# Patient Record
(demographics unavailable — no encounter records)

---

## 2024-10-15 NOTE — REVIEW OF SYSTEMS
[Fever] : no fever [Feeling Fatigued] : feeling fatigued [Blurry Vision] : no blurred vision [Earache] : no earache [SOB] : shortness of breath [Dyspnea on exertion] : dyspnea during exertion [Chest Discomfort] : chest discomfort [Cough] : no cough

## 2024-10-15 NOTE — PHYSICAL EXAM
[Well Developed] : well developed [Normal Conjunctiva] : normal conjunctiva [Normal Venous Pressure] : normal venous pressure [No Carotid Bruit] : no carotid bruit [Normal S1, S2] : normal S1, S2 [Clear Lung Fields] : clear lung fields [de-identified] : 3/6 ESM aortic area

## 2024-10-21 NOTE — REVIEW OF SYSTEMS
[Shortness Of Breath] : shortness of breath [Joint Pain] : joint pain [Joint Stiffness] : joint stiffness [Negative] : Cardiovascular

## 2024-10-22 NOTE — PHYSICAL EXAM
[Ambulatory and capable of all self care but unable to carry out any work activities] : Status 2- Ambulatory and capable of all self care but unable to carry out any work activities. Up and about more than 50% of waking hours [Normal] : affect appropriate [de-identified] : supple  [de-identified] : + murmur [de-identified] : no calf tenderness [de-identified] : Soft non tender  [de-identified] : no palpable tenderness [de-identified] : Antalgic gait

## 2024-10-22 NOTE — HISTORY OF PRESENT ILLNESS
[de-identified] : 73 yo F. with h/o rheumatoid arthritis, aortic stenosis, HTN, HLD, Vitamin B12 deficiency, diverticulosis and esophagitis. Patient was admitted at [facility] 2/10 to 2/15/2023 with SOB, HGB was 3.0 g/dL. She received 4 units of PRBC. EGD/colonoscopy done on 2/14 showed esophagitis as likely cause of GI bleed causing the anemia. Holland syndrome was ruled out. She was discharged on Ferrous Sulfate 1 tab daily. Patient is seen alone today in wheelchair. Patient reports taking Ferrous Sulfate since February with no adverse effects. Patient denies bleeding to all sites, no melena/hematochezia. Since February her HGB has been ranging from 8.1 to 9.9 g/dL. Most recent labs dated 11/6/2023 resulted with WBC 3.78 K/uL, RBC 4.07 M/uL, HGB 9.9 g/dL, HCT 32.3%, MCV 79.4 fl, RDW 16.6%,  K/uL, normal differential. Iron sat 6%, Ferritin 30 ng/mL, Vitamin B12 837 pg/mL, Folate 6.4 ng/mL, serum immunoelectrophoresis resulted with weak IgG kappa band- quantifiable Mspike. Patient states she completed Vitamin B12 replacement 2 weeks ago. 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 She is currently on Arava 20mg per day Orencia weekly. Recently completed Prednisone taper, states it was discontinued because of hypertension.  Patient's baseline hemoglobin level is normal. Patient eats a vegetarian diet.  Patient reports feeling well overall. Notes occ. headaches, with no lightheadedness/dizziness, chest pain, SOB and palpitations. Notes b/l knee and right shoulder pain/ stiffness.  Denies unexplained fevers, chills, night sweats and unintentional weight loss.   She was born in [place], [place] past 13 years She has 1 son living in [facility] She lives with her  and took a Taxi to the office [de-identified] : 10/21/2024: Patient presents for follow up of anemia. In the interim, received 1 dose of Feraheme in July. HGB since July 10.4 to 11.0 g/dL. Patient is scheduled for TAVR on 10/31 at [facility]. Patient feels good, no lightheadedness/ dizziness/ chest pain or palpitations. + SOB with exertion. Patient denies melena or hematochezia.

## 2024-10-22 NOTE — HISTORY OF PRESENT ILLNESS
[de-identified] : 71 yo F. with h/o rheumatoid arthritis, aortic stenosis, HTN, HLD, Vitamin B12 deficiency, diverticulosis and esophagitis. Patient was admitted at [facility] 2/10 to 2/15/2023 with SOB, HGB was 3.0 g/dL. She received 4 units of PRBC. EGD/colonoscopy done on 2/14 showed esophagitis as likely cause of GI bleed causing the anemia. Holland syndrome was ruled out. She was discharged on Ferrous Sulfate 1 tab daily. Patient is seen alone today in wheelchair. Patient reports taking Ferrous Sulfate since February with no adverse effects. Patient denies bleeding to all sites, no melena/hematochezia. Since February her HGB has been ranging from 8.1 to 9.9 g/dL. Most recent labs dated 11/6/2023 resulted with WBC 3.78 K/uL, RBC 4.07 M/uL, HGB 9.9 g/dL, HCT 32.3%, MCV 79.4 fl, RDW 16.6%,  K/uL, normal differential. Iron sat 6%, Ferritin 30 ng/mL, Vitamin B12 837 pg/mL, Folate 6.4 ng/mL, serum immunoelectrophoresis resulted with weak IgG kappa band- quantifiable Mspike. Patient states she completed Vitamin B12 replacement 2 weeks ago. 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 She is currently on Arava 20mg per day Orencia weekly. Recently completed Prednisone taper, states it was discontinued because of hypertension.  Patient's baseline hemoglobin level is normal. Patient eats a vegetarian diet.  Patient reports feeling well overall. Notes occ. headaches, with no lightheadedness/dizziness, chest pain, SOB and palpitations. Notes b/l knee and right shoulder pain/ stiffness.  Denies unexplained fevers, chills, night sweats and unintentional weight loss.   She was born in [place], living [place] the past 13 years She has 1 son living in [facility] She lives with her  and took a Taxi to the office [de-identified] : 10/21/2024: Patient presents for follow up of anemia. In the interim, received 1 dose of Feraheme in July. HGB since July 10.4 to 11.0 g/dL. Patient is scheduled for TAVR on 10/31 at [facility]. Patient feels good, no lightheadedness/ dizziness/ chest pain or palpitations. + SOB with exertion. Patient denies melena or hematochezia.

## 2024-10-22 NOTE — ASSESSMENT
[FreeTextEntry1] : 72 yo F. with h/o rheumatoid arthritis, aortic stenosis, HTN, HLD, Vitamin B12 deficiency, diverticulosis and esophagitis. Patient was admitted at [facility] 2/10 to 2/15/2023 with SOB, HGB was 3.0 g/dL. She received 4 units of PRBC. EGD/colonoscopy done on 2/14 showed esophagitis as likely cause of GI bleed causing the anemia. Holland syndrome was ruled out. Patient took Ferrous Sulfate 1 tab daily and remained iron deficient 1 year later. She was given 2 doses of Feraheme. Patient was started on oral vitamin B12 replacement and given an additional dose of Feraheme in July.  HGB improved and ranging from 10.4 to 11.0 g/dL over the past 3 months. Patient also has IgG MGUS.   --TIBC, Ferritin and pending, will call with results --We discussed anemia may be multifactorial due to chronic disease (rheumatoid arthritis) +/- iron deficiency +/- bone marrow disorder.  HGB has declined to 10.4 since last visit but stable overall. Will need BMB if no iron deficiency and HGB continues to decline. --RTC 3 months  10/22/24: Spoke with patient and discussed lab results c/w iron deficiency and vitamin B12 deficiency. Recommended Feraheme 510mg IV x 2 doses and start Vitamin B12 1000mcg PO daily. Recommended GI evaluation to evaluate for blood loss. RTC 3 months.

## 2024-10-22 NOTE — PHYSICAL EXAM
[Ambulatory and capable of all self care but unable to carry out any work activities] : Status 2- Ambulatory and capable of all self care but unable to carry out any work activities. Up and about more than 50% of waking hours [Normal] : affect appropriate [de-identified] : supple  [de-identified] : + murmur [de-identified] : no calf tenderness [de-identified] : Soft non tender  [de-identified] : no palpable tenderness [de-identified] : Antalgic gait

## 2024-10-22 NOTE — ASSESSMENT
[FreeTextEntry1] : 72 yo F. with h/o rheumatoid arthritis, aortic stenosis, HTN, HLD, Vitamin B12 deficiency, diverticulosis and esophagitis. Patient was admitted at [facility] 2/10 to 2/15/2023 with SOB, HGB was 3.0 g/dL. She received 4 units of PRBC. EGD/colonoscopy done on 2/14 showed esophagitis as likely cause of GI bleed causing the anemia. Holland syndrome was ruled out. Patient took Ferrous Sulfate 1 tab daily and remained iron deficient 1 year later. She was given 2 doses of Feraheme. Patient was started on oral vitamin B12 replacement and given an additional dose of Feraheme in July.  HGB improved and ranging from 10.4 to 11.0 g/dL over the past 3 months. Patient also has IgG MGUS.   --TIBC, Ferritin and pending, will call with results --We discussed anemia may be multifactorial due to chronic disease (rheumatoid arthritis) +/- iron deficiency +/- bone marrow disorder.  HGB has declined to 10.4 since last visit but stable overall. Will need BMB if no iron deficiency and HGB continues to decline. --RTC 3 months  10/22/24: Spoke with patient and discussed lab results c/w iron deficiency and vitamin B12 deficiency. Recommended Feraheme 510mg IV x 2 doses and start Vitamin B12 1000mcg PO daily. Recommended GI evaluation to evaluate for blood loss. RTC 3 months.

## 2024-10-22 NOTE — PHYSICAL EXAM
[Ambulatory and capable of all self care but unable to carry out any work activities] : Status 2- Ambulatory and capable of all self care but unable to carry out any work activities. Up and about more than 50% of waking hours [Normal] : affect appropriate [de-identified] : supple  [de-identified] : + murmur [de-identified] : no calf tenderness [de-identified] : Soft non tender  [de-identified] : no palpable tenderness [de-identified] : Antalgic gait

## 2024-10-22 NOTE — ASSESSMENT
[FreeTextEntry1] : 74 yo F. with h/o rheumatoid arthritis, aortic stenosis, HTN, HLD, Vitamin B12 deficiency, diverticulosis and esophagitis. Patient was admitted at [facility] 2/10 to 2/15/2023 with SOB, HGB was 3.0 g/dL. She received 4 units of PRBC. EGD/colonoscopy done on 2/14 showed esophagitis as likely cause of GI bleed causing the anemia. Holland syndrome was ruled out. Patient took Ferrous Sulfate 1 tab daily and remained iron deficient 1 year later. She was given 2 doses of Feraheme. Patient was started on oral vitamin B12 replacement and given an additional dose of Feraheme in July.  HGB improved and ranging from 10.4 to 11.0 g/dL over the past 3 months. Patient also has IgG MGUS.   --TIBC, Ferritin and pending, will call with results --We discussed anemia may be multifactorial due to chronic disease (rheumatoid arthritis) +/- iron deficiency +/- bone marrow disorder.  HGB has declined to 10.4 since last visit but stable overall. Will need BMB if no iron deficiency and HGB continues to decline. --RTC 3 months  10/22/24: Spoke with patient and discussed lab results c/w iron deficiency and vitamin B12 deficiency. Recommended Feraheme 510mg IV x 2 doses and start Vitamin B12 1000mcg PO daily. Recommended GI evaluation to evaluate for blood loss. RTC 3 months.

## 2024-10-22 NOTE — HISTORY OF PRESENT ILLNESS
[de-identified] : 71 yo F. with h/o rheumatoid arthritis, aortic stenosis, HTN, HLD, Vitamin B12 deficiency, diverticulosis and esophagitis. Patient was admitted at [facility] 2/10 to 2/15/2023 with SOB, HGB was 3.0 g/dL. She received 4 units of PRBC. EGD/colonoscopy done on 2/14 showed esophagitis as likely cause of GI bleed causing the anemia. Holland syndrome was ruled out. She was discharged on Ferrous Sulfate 1 tab daily. Patient is seen alone today in wheelchair. Patient reports taking Ferrous Sulfate since February with no adverse effects. Patient denies bleeding to all sites, no melena/hematochezia. Since February her HGB has been ranging from 8.1 to 9.9 g/dL. Most recent labs dated 11/6/2023 resulted with WBC 3.78 K/uL, RBC 4.07 M/uL, HGB 9.9 g/dL, HCT 32.3%, MCV 79.4 fl, RDW 16.6%,  K/uL, normal differential. Iron sat 6%, Ferritin 30 ng/mL, Vitamin B12 837 pg/mL, Folate 6.4 ng/mL, serum immunoelectrophoresis resulted with weak IgG kappa band- quantifiable Mspike. Patient states she completed Vitamin B12 replacement 2 weeks ago. 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 She is currently on Arava 20mg per day Orencia weekly. Recently completed Prednisone taper, states it was discontinued because of hypertension.  Patient's baseline hemoglobin level is normal. Patient eats a vegetarian diet.  Patient reports feeling well overall. Notes occ. headaches, with no lightheadedness/dizziness, chest pain, SOB and palpitations. Notes b/l knee and right shoulder pain/ stiffness.  Denies unexplained fevers, chills, night sweats and unintentional weight loss.   She was born in [place], [place] past 13 years She has 1 son living in [facility] She lives with her  and took a Taxi to the office [de-identified] : 10/21/2024: Patient presents for follow up of anemia. In the interim, received 1 dose of Feraheme in July. HGB since July 10.4 to 11.0 g/dL. Patient is scheduled for TAVR on 10/31 at [facility]. Patient feels good, no lightheadedness/ dizziness/ chest pain or palpitations. + SOB with exertion. Patient denies melena or hematochezia.

## 2024-11-06 NOTE — HISTORY OF PRESENT ILLNESS
[FreeTextEntry1] : Ms Brush is a 73 year old female referred by Dr. Rhonda Adams for evaluation of her aortic stenosis.  To review, PMH includes rheumatoid arthritis, aortic stenosis, HTN, HLD, Vitamin B12 deficiency, diverticulosis and esophagitis. Recently evaluated by Heme for her anemia.  in February 2023 at [facility] she was admitted with HGB was 3.0 g/dL, sp 4 units of PRBC and EGD/colonoscopy that showed esophagitis, maintained on ferrous sulfate since.   TTE 9/19/2024: The aortic valve appears trileaflet with reduced systolic excursion. There is calcification of the aortic valve leaflets. There is severe aortic stenosis. The peak transaortic velocity is 3.96 m/s, peak transaortic gradient is 62.7 mmHg and mean transaortic gradient is 37.0 mmHg with an LVOT/aortic valve VTI ratio of 0.27. The aortic valve area is estimated at 0.69 cm by the continuity equation. There is mild aortic regurgitation.  No cath or CT yet  Presents today with her family member.  She is in a wheelchair.  She reports at home, she walks with walker room to room  unable to ambulate or stand without walker due to BL knee pain. She does endorse after walking some distance she will get SOB. Lives home with , no stairs at home, unable to ambulate stairs outside due to her knees.  Denies swelling, weight gain, CP, palpitations, fever or chills.  Did not take meds today and is hypertensive as a result. Follows Dr. Griffin for cardiology.  She had TAVR CTA prior to her apt today. .

## 2024-11-06 NOTE — REVIEW OF SYSTEMS
[Feeling Poorly] : feeling poorly [Feeling Tired] : feeling tired [SOB on Exertion] : shortness of breath during exertion [As Noted in HPI] : as noted in HPI [Negative] : Heme/Lymph [FreeTextEntry9] : BL Knee pain

## 2024-11-06 NOTE — HISTORY OF PRESENT ILLNESS
[FreeTextEntry1] : Ms Brush is a 73 year old female referred by Dr. Rhonda Adams for evaluation of her aortic stenosis.  To review, PMH includes rheumatoid arthritis, aortic stenosis, HTN, HLD, Vitamin B12 deficiency, diverticulosis and esophagitis. Recently evaluated by Heme for her anemia.  in February 2023 at [facility] she was admitted with HGB was 3.0 g/dL, sp 4 units of PRBC and EGD/colonoscopy that showed esophagitis, maintained on ferrous sulfate since.   TTE 9/19/2024: The aortic valve appears trileaflet with reduced systolic excursion. There is calcification of the aortic valve leaflets. There is severe aortic stenosis. The peak transaortic velocity is 3.96 m/s, peak transaortic gradient is 62.7 mmHg and mean transaortic gradient is 37.0 mmHg with an LVOT/aortic valve VTI ratio of 0.27. The aortic valve area is estimated at 0.69 cm by the continuity equation. There is mild aortic regurgitation.  No cath or CT yet  Presents today with her family member.  She is in a wheelchair.  She reports at home, she walks with walker room to room  unable to ambulate or stand without walker due to BL knee pain. She does endorse after walking some distance she will get SOB. Lives home with , no stairs at home, unable to ambulate stairs outside due to her knees.  Denies swelling, weight gain, CP, palpitations, fever or chills.  Did not take meds today and is hypertensive as a result. Follows Dr. Griffin for cardiology.  She had TAVR CTA prior to her apt today. .

## 2024-11-06 NOTE — HISTORY OF PRESENT ILLNESS
[FreeTextEntry1] : Ms Brush is a 73 year old female referred by Dr. Rhonda Adams for evaluation of her aortic stenosis.  To review, PMH includes rheumatoid arthritis, aortic stenosis, HTN, HLD, Vitamin B12 deficiency, diverticulosis and esophagitis. Recently evaluated by Heme for her anemia.  in February 2023 at [facility] she was admitted with HGB was 3.0 g/dL, sp 4 units of PRBC and EGD/colonoscopy that showed esophagitis, maintained on ferrous sulfate since.   TTE 9/19/2024: The aortic valve appears trileaflet with reduced systolic excursion. There is calcification of the aortic valve leaflets. There is severe aortic stenosis. The peak transaortic velocity is 3.96 m/s, peak transaortic gradient is 62.7 mmHg and mean transaortic gradient is 37.0 mmHg with an LVOT/aortic valve VTI ratio of 0.27. The aortic valve area is estimated at 0.69 cm by the continuity equation. There is mild aortic regurgitation.  No cath or CT yet  Presents today with her family member.  She is in a wheelchair.  She reports at home, she walks with walker room to room  unable to ambulate or stand without walker due to BL knee pain. She does endorse after walking some distance she will get SOB. Lives home with , no stairs at home, unable to ambulate stairs outside due to her knees.  Denies swelling, weight gain, CP, palpitations, fever or chills.  Did not take meds today and is hypertensive as a result. Follows Dr. Griffin for cardiology.  She had TAVR CTA prior to her apt today. .

## 2024-11-06 NOTE — END OF VISIT
[FreeTextEntry3] : I, Dr. Jacinto, personally performed the evaluation and management for this post op patient who presents today. Evaluation includes conducting the examination, assessing all conditions and establishing a plan of care moving forward. Today, the ACP, Casimiro Paiz, was here to observe my evaluation and management services for this patient.

## 2024-11-06 NOTE — HISTORY OF PRESENT ILLNESS
[FreeTextEntry1] : Ms Brush is a 73 year old female referred by Dr. Rhonda Adams for evaluation of her aortic stenosis.  To review, PMH includes rheumatoid arthritis, aortic stenosis, HTN, HLD, Vitamin B12 deficiency, diverticulosis and esophagitis. Recently evaluated by Heme for her anemia.  in February 2023 at [facility] she was admitted with HGB was 3.0 g/dL, sp 4 units of PRBC and EGD/colonoscopy that showed esophagitis, maintained on ferrous sulfate since.   TTE 9/19/2024: The aortic valve appears trileaflet with reduced systolic excursion. There is calcification of the aortic valve leaflets. There is severe aortic stenosis. The peak transaortic velocity is 3.96 m/s, peak transaortic gradient is 62.7 mmHg and mean transaortic gradient is 37.0 mmHg with an LVOT/aortic valve VTI ratio of 0.27. The aortic valve area is estimated at 0.69 cm by the continuity equation. There is mild aortic regurgitation.  No cath or CT yet  Presents today with her family member.  She is in a wheelchair.  She reports at home, she walks with walker room to room  unable to ambulate or stand without walker due to BL knee pain. She does endorse after walking some distance she will get SOB. Lives home with , no stairs at home, unable to ambulate stairs outside due to her knees.  Denies swelling, weight gain, CP, palpitations, fever or chills.  Did not take meds today and is hypertensive as a result. Follows Dr. Griffin for cardiology.  She had TAVR CTA prior to her apt today. .

## 2024-11-06 NOTE — PHYSICAL EXAM
[General Appearance - Alert] : alert [General Appearance - In No Acute Distress] : in no acute distress [Sclera] : the sclera and conjunctiva were normal [Neck Appearance] : the appearance of the neck was normal [Jugular Venous Distention Increased] : there was no jugular-venous distention [] : no respiratory distress [Respiration, Rhythm And Depth] : normal respiratory rhythm and effort [Exaggerated Use Of Accessory Muscles For Inspiration] : no accessory muscle use [Auscultation Breath Sounds / Voice Sounds] : lungs were clear to auscultation bilaterally [Apical Impulse] : the apical impulse was normal [Heart Rate And Rhythm] : heart rate was normal and rhythm regular [Heart Sounds] : normal S1 and S2 [Abdomen Soft] : soft [Abdomen Tenderness] : non-tender [Involuntary Movements] : no involuntary movements were seen [No Focal Deficits] : no focal deficits [Oriented To Time, Place, And Person] : oriented to person, place, and time [Impaired Insight] : insight and judgment were intact [Affect] : the affect was normal [Mood] : the mood was normal [FreeTextEntry1] : 3/6 LIYAH

## 2024-11-06 NOTE — ASSESSMENT
[FreeTextEntry1] : I reviewed the cardiac imaging, medical records and reports with patient and discussed the case.  I discussed the risks, benefits and alternatives to both surgical aortic valve replacement (SAVR) and Transcatheter Aortic Valve Replacement (TAVR). Risks included but not limited to bleeding, stroke, Myocardial Infarction, kidney problems, blood transfusion, permanent  pacemaker implantation, infections and death. I also discussed the various approaches in detail.  I feel that the patient will benefit and is a candidate for TAVR. All questions and concerns were addressed.   Plan:  - Will review TAVR CT done today - Cardiac cath with Dr. Rhonda Adams  *** Addendum: After pt went home she was called to set up cath with Dr. Adams.  Pt reports she wished not to proceed with TAVR at this time and wanted to discuss with her family.  Instructed to follow up with Dr. Rhonda Adams.   Pt agreeable for TAVR work up

## 2024-11-12 NOTE — HISTORY OF PRESENT ILLNESS
[4] : 4 [N/A] : N/A [Denies] : Denies [Yes] : Yes [Declined] : Declined [Left upper extremity] : Left upper extremity [24g] : 24g [Start Time: ___] : Medication Start Time: [unfilled] [End Time: ___] : Medication End Time: [unfilled] [Medication Name: ___] : Medication Name: [unfilled] [Total Amount Administered: ___] : Total Amount Administered: [unfilled] [IV discontinued. Intact. No signs or symptoms of IV complications noted. Time: ___] : IV discontinued. Intact. No signs or symptoms of IV complications noted. Time: [unfilled] [Patient  instructed to seek medical attention with signs and symptoms of adverse effects] : Patient  instructed to seek medical attention with signs and symptoms of adverse effects [Patient left unit in no acute distress] : Patient left unit in no acute distress [Medications administered as ordered and tolerated well.] : Medications administered as ordered and tolerated well. [Blood drawn at time of visit] : Blood drawn at time of visit [de-identified] : generalized joint pain [de-identified] : ambulates with walker [de-identified] : median cubital vein  [de-identified] : labs drawn as per ordered by MD  [de-identified] : Patient presents for scheduled Orencia infusion, doing well overall. Patient reports having last infusion well and denies any AEs. Patient denies any recent infection, antibiotic use or hospitalizations. Patient reports having pain to knees, rated as above. Patient reports having stiffness and swelling to knees. No other symptoms or concerns noted at this time. Patient tolerated infusion well.

## 2024-11-14 NOTE — HISTORY OF PRESENT ILLNESS
[de-identified] : 11/14/2024  James Brush   Presents to the office for follow-up of her bilateral knee pain.  Patient continues to have bilateral knee pain and decreased range of motion.  Patient was seen by cardiology and is going to plan for TAVR in June 2025.  No falls.  No fevers or chills.  10/1/2024 James Brush is a 73-year-old female presents the office for evaluation of her bilateral knee pain.  Patient has been experiencing bilateral knee pain for a few years.  She has a history of rheumatoid arthritis and is on prednisone, Orencia, and leflunomide.  She has decreased knee range of motion.  She can have right hip pain.  Patient tried injections, without relief.  No falls.  No fevers or chills.  She has not tried physical therapy.  Of note, patient has follow-up with cardiology regarding possible TAVR.  History: RA, Anemia, Aortic Stenosis

## 2024-11-14 NOTE — DISCUSSION/SUMMARY
[de-identified] : James Brush is a 73-year-old female presents to the office for evaluation of her bilateral knee pain.  Patient has been experiencing bilateral knee pain for a few years.  X-rays showed severe bilateral knee osteoarthritis.  Examination showed decreased bilateral knee range of motion. Discussed with the patient the examination and imaging findings. Discussed with the patient the operative and nonoperative management of knee osteoarthritis, including total knee arthroplasty.   Discussed that due to patient's cardiac issues that she would be indicated for nonoperative management at this time. Patient would like to continue with nonoperative management at this time. Discussed with the patient the nonoperative management of patient's knee osteoarthritis at this time, including physical therapy, anti-inflammatories, and injections. Patient was given a referral for physical therapy.  Patient will take Tylenol as needed for pain control.  Patient will follow up with Cardiology regarding TAVR. Patient will follow-up in 3 months for reevaluation and management.  Patient understanding and in agreement the plan.  All questions answered   Plan: -Follow up with Cardiology -Physical Therapy -Tylenol as needed for pain control -Follow up in 3 months for reevaluation and management

## 2024-11-14 NOTE — DISCUSSION/SUMMARY
[de-identified] : James Brush is a 73-year-old female presents to the office for evaluation of her bilateral knee pain.  Patient has been experiencing bilateral knee pain for a few years.  X-rays showed severe bilateral knee osteoarthritis.  Examination showed decreased bilateral knee range of motion. Discussed with the patient the examination and imaging findings. Discussed with the patient the operative and nonoperative management of knee osteoarthritis, including total knee arthroplasty.   Discussed that due to patient's cardiac issues that she would be indicated for nonoperative management at this time. Patient would like to continue with nonoperative management at this time. Discussed with the patient the nonoperative management of patient's knee osteoarthritis at this time, including physical therapy, anti-inflammatories, and injections. Patient was given a referral for physical therapy.  Patient will take Tylenol as needed for pain control.  Patient will follow up with Cardiology regarding TAVR. Patient will follow-up in 3 months for reevaluation and management.  Patient understanding and in agreement the plan.  All questions answered   Plan: -Follow up with Cardiology -Physical Therapy -Tylenol as needed for pain control -Follow up in 3 months for reevaluation and management

## 2024-11-14 NOTE — PHYSICAL EXAM
[de-identified] : Constitutional:  73 year old female, alert and oriented, cooperative, in no acute distress.  HEENT  NC/AT.  Appearance: symmetric  Chest/Respiratory  Respiratory effort: no intercostal retractions or use of accessory muscles. Nonlabored Breathing  Mental Status:  Judgment, insight: intact Orientation: oriented to time, place, and person  Left Knee  Inspection:     Skin intact, no rashes or lesions     No Effusion     Non-tender to palpation over tibial tubercle, patella, lateral joint line, and pes insertion.     Tenderness over the medial joint line      Range of Motion: 	Extension - -10degrees 	Flexion - 80 degrees 	Extensor lag: None  Stability:      Demonstrates no Varus or Valgus instability      Negative Anterior or Posterior drawer.      Negative Lachman's  Patella: stable, tracks well.   Right Knee  Inspection:     Skin intact, no rashes or lesions     No Effusion     Non-tender to palpation over tibial tubercle, patella, lateral joint line, and pes insertion.     Tenderness over the medial joint line      Range of Motion: 	Extension - -10degrees 	Flexion - 80 degrees 	Extensor lag: None  Stability:      Demonstrates no Varus or Valgus instability      Negative Anterior or Posterior drawer.      Negative Lachman's  Patella: stable, tracks well.   Neurologic Exam     Motor intact including 5/5 Extensor Hallucis Longus, 5/5 Flexor Hallucis Longus, 5/5 Tibialis Anterior and 5/5 Gastrocnemius     Sensation Intact to Light Touch including Saphenous, Sural, Superficial Peroneal, Deep Peroneal, Tibial nerve distributions  Vascular Exam     Foot is warm and well perfused with 2+ Dorsalis Pedis Pulse   No pain with range of motion of the bilateral hips. No lumbar paraspinal muscle tenderness. [de-identified] : XRay:  XRays of the Pelvis (1 View) taken on 10/1/2024. XRays demonstrate no obvious fracture or dislocation. There is osteoarthritis in the right hip. 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 (my personal interpretation).  XRay: XRays of the Right Knee (4 Views) taken on 10/1/2024. XRays demonstrate tricompartmental joint space narrowing, with bone on bone articulations, with subchondral sclerosis, overlying osteophytes, all consistent with severe osteoarthritis, KL Grade: 4. (my personal interpretation)  XRay: XRays of the Left Knee (4 Views) taken on 10/1/2024. XRays demonstrate tricompartmental joint space narrowing, with bone on bone articulations, with subchondral sclerosis, overlying osteophytes, all consistent with severe osteoarthritis, KL Grade: 4. (my personal interpretation)

## 2024-11-14 NOTE — PHYSICAL EXAM
[de-identified] : Constitutional:  73 year old female, alert and oriented, cooperative, in no acute distress.  HEENT  NC/AT.  Appearance: symmetric  Chest/Respiratory  Respiratory effort: no intercostal retractions or use of accessory muscles. Nonlabored Breathing  Mental Status:  Judgment, insight: intact Orientation: oriented to time, place, and person  Left Knee  Inspection:     Skin intact, no rashes or lesions     No Effusion     Non-tender to palpation over tibial tubercle, patella, lateral joint line, and pes insertion.     Tenderness over the medial joint line      Range of Motion: 	Extension - -10degrees 	Flexion - 80 degrees 	Extensor lag: None  Stability:      Demonstrates no Varus or Valgus instability      Negative Anterior or Posterior drawer.      Negative Lachman's  Patella: stable, tracks well.   Right Knee  Inspection:     Skin intact, no rashes or lesions     No Effusion     Non-tender to palpation over tibial tubercle, patella, lateral joint line, and pes insertion.     Tenderness over the medial joint line      Range of Motion: 	Extension - -10degrees 	Flexion - 80 degrees 	Extensor lag: None  Stability:      Demonstrates no Varus or Valgus instability      Negative Anterior or Posterior drawer.      Negative Lachman's  Patella: stable, tracks well.   Neurologic Exam     Motor intact including 5/5 Extensor Hallucis Longus, 5/5 Flexor Hallucis Longus, 5/5 Tibialis Anterior and 5/5 Gastrocnemius     Sensation Intact to Light Touch including Saphenous, Sural, Superficial Peroneal, Deep Peroneal, Tibial nerve distributions  Vascular Exam     Foot is warm and well perfused with 2+ Dorsalis Pedis Pulse   No pain with range of motion of the bilateral hips. No lumbar paraspinal muscle tenderness. [de-identified] : XRay:  XRays of the Pelvis (1 View) taken on 10/1/2024. XRays demonstrate no obvious fracture or dislocation. There is osteoarthritis in the right hip. 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 (my personal interpretation).  XRay: XRays of the Right Knee (4 Views) taken on 10/1/2024. XRays demonstrate tricompartmental joint space narrowing, with bone on bone articulations, with subchondral sclerosis, overlying osteophytes, all consistent with severe osteoarthritis, KL Grade: 4. (my personal interpretation)  XRay: XRays of the Left Knee (4 Views) taken on 10/1/2024. XRays demonstrate tricompartmental joint space narrowing, with bone on bone articulations, with subchondral sclerosis, overlying osteophytes, all consistent with severe osteoarthritis, KL Grade: 4. (my personal interpretation)

## 2024-11-14 NOTE — HISTORY OF PRESENT ILLNESS
[de-identified] : 11/14/2024  James Brush   Presents to the office for follow-up of her bilateral knee pain.  Patient continues to have bilateral knee pain and decreased range of motion.  Patient was seen by cardiology and is going to plan for TAVR in June 2025.  No falls.  No fevers or chills.  10/1/2024 James Brush is a 73-year-old female presents the office for evaluation of her bilateral knee pain.  Patient has been experiencing bilateral knee pain for a few years.  She has a history of rheumatoid arthritis and is on prednisone, Orencia, and leflunomide.  She has decreased knee range of motion.  She can have right hip pain.  Patient tried injections, without relief.  No falls.  No fevers or chills.  She has not tried physical therapy.  Of note, patient has follow-up with cardiology regarding possible TAVR.  History: RA, Anemia, Aortic Stenosis

## 2024-11-18 NOTE — ASSESSMENT
[FreeTextEntry1] : 1.  Anemia, likely from iron deficiency, with superimposed chronic disease/drug-induced, MGUS.  Previously noted severe erosive esophagitis with hiatal hernia; and diverticulosis.  May have concomitant small bowel source of blood loss (?increased risk of AVMs with aortic stenosis). 2.  Severe aortic stenosis, awaiting TAVR. 3.  Overweight. 4.  Rheumatoid arthritis. 5.  Hypertension. 6.  Hyperlipidemia. 7.  MGUS. 8.  Osteoporosis.  Plan: 1.  Extensive medical records have been reviewed. 2.  No GI procedures (such as repeat EGD or capsule endoscopy) are contemplated until after TAVR. 3.  For now, increase omeprazole to 40 mg twice daily on an empty stomach. 4.  Return here after more definitive aortic valve treatment, if anemia is persistent/worsening.  She is aware that I will be retiring in early spring.

## 2024-11-18 NOTE — REVIEW OF SYSTEMS
[Feeling Tired] : feeling tired [Shortness Of Breath] : shortness of breath [SOB on Exertion] : shortness of breath during exertion [Joint Stiffness] : joint stiffness [Negative] : Heme/Lymph [As Noted in HPI] : as noted in HPI [FreeTextEntry7] : gas

## 2024-11-18 NOTE — CONSULT LETTER
[Dear  ___] : Dear  [unfilled], [Consult Letter:] : I had the pleasure of evaluating your patient, [unfilled]. [Please see my note below.] : Please see my note below. [Consult Closing:] : Thank you very much for allowing me to participate in the care of this patient.  If you have any questions, please do not hesitate to contact me. [Sincerely,] : Sincerely, [FreeTextEntry3] : Stefan Fulton M.D. [DrBambi  ___] : Dr. DAVIDSON [___] : [unfilled]

## 2024-11-18 NOTE — PHYSICAL EXAM
[Alert] : alert [Well Developed] : well developed [Well Nourished] : well nourished [Normal] : no respiratory distress, no accessory muscle use, normal respiratory rhythm and effort, lungs were clear to auscultation bilaterally [Heart Rate And Rhythm] : heart rate was normal and rhythm regular [None] : no edema [Bowel Sounds] : normal bowel sounds [Abdomen Tenderness] : non-tender [No Masses] : no abdominal mass palpated [Abdomen Soft] : soft [] : no hepatosplenomegaly [Patient Refused] : rectal exam was refused by the patient [Inguinal Lymph Nodes Enlarged Bilaterally] : no inguinal lymphadenopathy [de-identified] : 3/6 systolic murmur [de-identified] : moderately overweight; SOB on mild exertion [de-identified] : arthritic changes [de-identified] : sitting in chair, uses a walker

## 2024-11-18 NOTE — HISTORY OF PRESENT ILLNESS
[FreeTextEntry1] : James presents with decreasing Hgb.  She had been hospitalized in February 2023 with Hgb 3, with EGD 2/14/2023 revealing severe esophagitis and hiatal hernia and a colonoscopy revealing diverticulosis.  She was started on omeprazole 40 mg twice daily, given oral and then parenteral iron, with increase in Hgb to as much as 11.  However, recently, hemoglobin has begun to decrease, last 10.1/Hct 32.8.  She was also found to have severe aortic stenosis, contemplating TAVR; and MGUS.  Reflux symptoms have been well-controlled on omeprazole 40 mg once daily at this point.  She reports occasional gassiness but bowels have been regular, without gross bleeding.  She denies ASA/NSAID use.

## 2024-12-11 NOTE — HISTORY OF PRESENT ILLNESS
[4] : 4 [N/A] : N/A [Denies] : Denies [Yes] : Yes [Informed consent documented in EHR.] : Informed consent documented in EHR. [Left upper extremity] : Left upper extremity [24g] : 24g [Start Time: ___] : Medication Start Time: [unfilled] [End Time: ___] : Medication End Time: [unfilled] [Medication Name: ___] : Medication Name: [unfilled] [Total Amount Administered: ___] : Total Amount Administered: [unfilled] [IV discontinued. Intact. No signs or symptoms of IV complications noted. Time: ___] : IV discontinued. Intact. No signs or symptoms of IV complications noted. Time: [unfilled] [Patient  instructed to seek medical attention with signs and symptoms of adverse effects] : Patient  instructed to seek medical attention with signs and symptoms of adverse effects [Patient left unit in no acute distress] : Patient left unit in no acute distress [Medications administered as ordered and tolerated well.] : Medications administered as ordered and tolerated well. [Blood drawn at time of visit] : Blood drawn at time of visit [de-identified] : wrists, hands, digits and knees [de-identified] : ambulates with walker [de-identified] : left arm median cubital vein  [de-identified] :  Labs drawn as prescribed. [de-identified] : Patient presents for scheduled Orencia infusion, ambulating using rolling walker in NAD. Patient reports having joints pain as rated above. Patient reports having stiffness and swelling to knees. as well. Reports moderate daily fatigue. Denies recent falls. No other symptoms or concerns verbalized at this time. Patient tolerated infusion well.

## 2025-01-08 NOTE — HISTORY OF PRESENT ILLNESS
[4] : 4 [N/A] : N/A [Denies] : Denies [Yes] : Yes [Informed consent documented in EHR.] : Informed consent documented in EHR. [de-identified] : wrists, hands, digits and knees [de-identified] : ambulates with walker [Left upper extremity] : Left upper extremity [22g] : 22g [Start Time: ___] : Medication Start Time: [unfilled] [End Time: ___] : Medication End Time: [unfilled] [Medication Name: ___] : Medication Name: [unfilled] [Total Amount Administered: ___] : Total Amount Administered: [unfilled] [IV discontinued. Intact. No signs or symptoms of IV complications noted. Time: ___] : IV discontinued. Intact. No signs or symptoms of IV complications noted. Time: [unfilled] [Patient  instructed to seek medical attention with signs and symptoms of adverse effects] : Patient  instructed to seek medical attention with signs and symptoms of adverse effects [Patient left unit in no acute distress] : Patient left unit in no acute distress [Medications administered as ordered and tolerated well.] : Medications administered as ordered and tolerated well. [Blood drawn at time of visit] : Blood drawn at time of visit [de-identified] : left arm median cubital vein  [de-identified] :  Labs drawn as prescribed. [de-identified] : Patient presents for scheduled Orencia infusion, ambulating using rolling walker in NAD. Patient reports having joints pain as rated above. Patient reports having stiffness and swelling to knees. as well. Reports moderate daily fatigue. Denies recent falls. No other symptoms or concerns verbalized at this time. Patient tolerated infusion well.

## 2025-02-03 NOTE — PHYSICAL EXAM
[General Appearance - Alert] : alert [Sclera] : the sclera and conjunctiva were normal [Neck Appearance] : the appearance of the neck was normal [Respiration, Rhythm And Depth] : normal respiratory rhythm and effort [Auscultation Breath Sounds / Voice Sounds] : lungs were clear to auscultation bilaterally [Apical Impulse] : the apical impulse was normal [Heart Sounds] : normal S1 and S2 [Abdomen Tenderness] : non-tender [] : no rash [Oriented To Time, Place, And Person] : oriented to person, place, and time

## 2025-02-03 NOTE — HISTORY OF PRESENT ILLNESS
[___ Month(s) Ago] : [unfilled] month(s) ago [RF] : rheumatoid factor [CCP] : Cyclic citrullinated peptide (CCP) antibody [Erosions] : erosions [Joint Swelling] : joint swelling [Joint Pain] : joint pain [Morning Stiffness] : morning stiffness

## 2025-02-19 NOTE — DISCUSSION/SUMMARY
[de-identified] : James Brush is a 73-year-old female presents to the office for evaluation of her bilateral knee pain.  Patient has been experiencing bilateral knee pain for a few years. Prior  X-rays showed severe bilateral knee osteoarthritis.  Examination showed decreased bilateral knee range of motion. Discussed with the patient the examination and imaging findings. Discussed with the patient the operative and nonoperative management of knee osteoarthritis, including total knee arthroplasty.   Discussed with the patient the nonoperative management of patient's knee osteoarthritis at this time, including physical therapy, anti-inflammatories, and injections. Patient was given a referral for physical therapy.  Patient will take Tylenol as needed for pain control.  Patient will follow up with Cardiology and GI. Patient will follow-up in 3 months for reevaluation and management.  Patient understanding and in agreement the plan.  All questions answered   Plan: -Follow up with Cardiology and GI -Physical Therapy -Tylenol as needed for pain control -Follow up in 2 months for reevaluation and management

## 2025-02-19 NOTE — HISTORY OF PRESENT ILLNESS
[de-identified] : 2/19/2025  James Brush presents to the office for follow-up of her bilateral knee pain.  Patient continues to have knee pain.  She did not undergo her TAVR.  She tried physical therapy, but it worsened the pain.  Her last H/H was 8.6.  No falls.  11/14/2024  James Brush presents to the office for follow-up of her bilateral knee pain.  Patient continues to have bilateral knee pain and decreased range of motion.  Patient was seen by cardiology and is going to plan for TAVR in June 2025.  No falls.  No fevers or chills.  10/1/2024 James Brush is a 73-year-old female presents the office for evaluation of her bilateral knee pain.  Patient has been experiencing bilateral knee pain for a few years.  She has a history of rheumatoid arthritis and is on prednisone, Orencia, and leflunomide.  She has decreased knee range of motion.  She can have right hip pain.  Patient tried injections, without relief.  No falls.  No fevers or chills.  She has not tried physical therapy.  Of note, patient has follow-up with cardiology regarding possible TAVR.  History: RA, Anemia, Aortic Stenosis

## 2025-02-19 NOTE — PHYSICAL EXAM
[de-identified] : Constitutional:  73 year old female, alert and oriented, cooperative, in no acute distress.  HEENT  NC/AT.  Appearance: symmetric  Chest/Respiratory  Respiratory effort: no intercostal retractions or use of accessory muscles. Nonlabored Breathing  Mental Status:  Judgment, insight: intact Orientation: oriented to time, place, and person  Left Knee  Inspection:     Skin intact, no rashes or lesions     No Effusion     Non-tender to palpation over tibial tubercle, patella, lateral joint line, and pes insertion.      Range of Motion: 	Extension - -10degrees 	Flexion - 80 degrees 	Extensor lag: None  Stability:      Demonstrates no Varus or Valgus instability      Negative Anterior or Posterior drawer.      Negative Lachman's  Patella: stable, tracks well.   Right Knee  Inspection:     Skin intact, no rashes or lesions     No Effusion     Non-tender to palpation over tibial tubercle, patella, lateral joint line, and pes insertion.     Tenderness over the medial joint line      Range of Motion: 	Extension - -10degrees 	Flexion - 80 degrees 	Extensor lag: None  Stability:      Demonstrates no Varus or Valgus instability      Negative Anterior or Posterior drawer.      Negative Lachman's  Patella: stable, tracks well.   Neurologic Exam     Motor intact including 5/5 Extensor Hallucis Longus, 5/5 Flexor Hallucis Longus, 5/5 Tibialis Anterior and 5/5 Gastrocnemius     Sensation Intact to Light Touch including Saphenous, Sural, Superficial Peroneal, Deep Peroneal, Tibial nerve distributions  Vascular Exam     Foot is warm and well perfused with 2+ Dorsalis Pedis Pulse   No pain with range of motion of the bilateral hips. No lumbar paraspinal muscle tenderness. [de-identified] : XRay:  XRays of the Pelvis (1 View) taken on 10/1/2024. XRays demonstrate no obvious fracture or dislocation. There is osteoarthritis in the right hip. 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 (my personal interpretation).  XRay: XRays of the Right Knee (4 Views) taken on 10/1/2024. XRays demonstrate tricompartmental joint space narrowing, with bone on bone articulations, with subchondral sclerosis, overlying osteophytes, all consistent with severe osteoarthritis, KL Grade: 4. (my personal interpretation)  XRay: XRays of the Left Knee (4 Views) taken on 10/1/2024. XRays demonstrate tricompartmental joint space narrowing, with bone on bone articulations, with subchondral sclerosis, overlying osteophytes, all consistent with severe osteoarthritis, KL Grade: 4. (my personal interpretation)

## 2025-03-05 NOTE — HISTORY OF PRESENT ILLNESS
[5] : 5 [N/A] : N/A [Denies] : Denies [Yes] : Yes [Informed consent documented in EHR.] : Informed consent documented in EHR. [de-identified] : knees [de-identified] : ambulates with walker [Left upper extremity] : Left upper extremity [24g] : 24g [Start Time: ___] : Medication Start Time: [unfilled] [End Time: ___] : Medication End Time: [unfilled] [Medication Name: ___] : Medication Name: [unfilled] [Total Amount Administered: ___] : Total Amount Administered: [unfilled] [IV discontinued. Intact. No signs or symptoms of IV complications noted. Time: ___] : IV discontinued. Intact. No signs or symptoms of IV complications noted. Time: [unfilled] [Patient  instructed to seek medical attention with signs and symptoms of adverse effects] : Patient  instructed to seek medical attention with signs and symptoms of adverse effects [Patient left unit in no acute distress] : Patient left unit in no acute distress [Medications administered as ordered and tolerated well.] : Medications administered as ordered and tolerated well. [Blood drawn at time of visit] : Blood drawn at time of visit [de-identified] : AC [de-identified] : Patient presents for scheduled Orencia infusion, ambulating using rolling walker in NAD. Patient reports having joints pain as rated above. Patient reports having stiffness and swelling to knees. as well. Reports moderate daily fatigue. Denies recent falls. No other symptoms or concerns verbalized at this time. Patient tolerated infusion well.  [de-identified] :  Labs drawn as prescribed.

## 2025-03-10 NOTE — ASSESSMENT
[FreeTextEntry1] : 74 yo F. with h/o rheumatoid arthritis, aortic stenosis, HTN, HLD, Vitamin B12 deficiency, diverticulosis and esophagitis. Patient was admitted at [facility] 2/10 to 2/15/2023 with SOB, HGB was 3.0 g/dL. She received 4 units of PRBC. EGD/colonoscopy done on 2/14 showed esophagitis as likely cause of GI bleed causing the anemia. Holland syndrome was ruled out. Patient took Ferrous Sulfate 1 tab daily and remained iron deficient 1 year later. She was given 2 doses of Feraheme January 2024. Patient was started on oral vitamin B12 replacement and given an additional dose of Feraheme in July and November. More recently patient has had acute hgb drop from 10.2g/dL (January) to 8.6 g/dL (February) to 6.4 g/dL today.  --Recommended ER visit for PRBC transfusion and GI consultation to evaluate for GI bleeding. Patient agrees to present to [facility] ER --Comprehensive anemia workup repeated today, will call with result --IF iron deficient-- would recommend iron supplementation with Feraheme 510 mg IV once a week x two doses --RTC 2 months

## 2025-03-10 NOTE — PHYSICAL EXAM
[de-identified] : + pallor [de-identified] : supple  [de-identified] : + murmur [de-identified] : no calf tenderness, + trace pitting edema [de-identified] : Soft non tender  [de-identified] : Antalgic gait / ambulates with rolling walker

## 2025-03-10 NOTE — HISTORY OF PRESENT ILLNESS
[de-identified] : 71 yo F. with h/o rheumatoid arthritis, aortic stenosis, HTN, HLD, Vitamin B12 deficiency, diverticulosis and esophagitis. Patient was admitted at [facility] 2/10 to 2/15/2023 with SOB, HGB was 3.0 g/dL. She received 4 units of PRBC. EGD/colonoscopy done on 2/14 showed esophagitis as likely cause of GI bleed causing the anemia. Holland syndrome was ruled out. She was discharged on Ferrous Sulfate 1 tab daily. Patient is seen alone today in wheelchair. Patient reports taking Ferrous Sulfate since February with no adverse effects. Patient denies bleeding to all sites, no melena/hematochezia. Since February her HGB has been ranging from 8.1 to 9.9 g/dL. Most recent labs dated 11/6/2023 resulted with WBC 3.78 K/uL, RBC 4.07 M/uL, HGB 9.9 g/dL, HCT 32.3%, MCV 79.4 fl, RDW 16.6%,  K/uL, normal differential. Iron sat 6%, Ferritin 30 ng/mL, Vitamin B12 837 pg/mL, Folate 6.4 ng/mL, serum immunoelectrophoresis resulted with weak IgG kappa band- quantifiable Mspike. Patient states she completed Vitamin B12 replacement 2 weeks ago. 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 She is currently on Arava 20mg per day Orencia weekly. Recently completed Prednisone taper, states it was discontinued because of hypertension.  Patient's baseline hemoglobin level is normal. Patient eats a vegetarian diet.  Patient reports feeling well overall. Notes occ. headaches, with no lightheadedness/dizziness, chest pain, SOB and palpitations. Notes b/l knee and right shoulder pain/ stiffness.  Denies unexplained fevers, chills, night sweats and unintentional weight loss.   She was born in [place], [place] past 13 [place] She has 1 son living in [facility] She lives with her  and took a Taxi to the office [de-identified] : 3/10/2025: Patient returns for follow up. In the interim, received Feraheme x 1 dose in November. Her hgb has declined to 6.7 as of 3/5/25. She saw GI, and plan was to consider scopes after the TAVR, but the patient ultimately did not undergo the procedure. Patient denies melena and hematochezia. She reports productive cough x 3 weeks, along with sob and palpitations with exertion.  Notes no fevers, chills and night sweats.

## 2025-03-25 NOTE — HISTORY OF PRESENT ILLNESS
[Post-hospitalization from ___ Hospital] : Post-hospitalization from [unfilled] Hospital [Discharged on ___] : discharged on [unfilled] [Admitted on: ___] : The patient was admitted on [unfilled] [FreeTextEntry2] : Patient is a 72-year-old with hx of esophagitis, anemia, AS, RA coming in for a HFU visit.   patient was admitted to [facility] for low H/H. Patient is s/p 2 units PRBCs during hospital stay. Post transfusion H/H stable.   Following Hematology. Getting Feraheme infusions. As per patient, since discharge received Feraheme infusionsx2. She denies any chest pain, denies palpitations, SOB, denies SCRUGGS, denies hematemesis, denies melena. Recent H/H stable. next hematology appointment in May.   Hospital Course: Discharge Date 12-Mar-2025 Admission Date 11-Mar-2025 03:26 Reason for Admission anemia Hospital Course HPI: MICK NEWTON is a 73 year old woman with iron deficiency anemia, anemia of chronic disease, RA, HTN, HLD, aortic stenosis who was sent by her hematologist to the ED for a low Hgb in the context of fatigue, lightheadedness, and shortness of breath for 2 months. These symptoms are worse with exertion and improve with rest. In the past 2-3 weeks, she has also noted URI symptoms, such as cough with white sputum and shortness of breath, but no fever or chills. She follows up with a hematologist regularly, with baseline Hgb 8-10, but was noted since Nov 2024 to have Hgb decline, as low as 6.7 during most recent hematologist appt in past 1 day. (11 Mar 2025 07:52)   Hospital Course: Patient was treated with 2 total units of packed red blood cells. 1st unit was provided in the ED. Patient's initial hgb was 6.4 then 7.6 after 1st unit. The following day the patient's hemoglobin was 7.1, despite endorsing that her symptoms of SOB and fatigue have resolved. Patient's ferritin was also noted to be low. She was treated with IV iron. Prior to discharge, patient was given an additional 1 unit of pRBC with close outpatient follow up. On day of discharge, patient is clinically stable with no new exam findings or acute symptoms compared to prior. The patient was seen by the attending physician on the date of discharge and deemed stable and acceptable for discharge. The patient's chronic medical conditions were treated accordingly per the patient's home medication regimen. The patient's medication reconciliation (with changes made to chronic medications), follow up appointments, discharge orders, instructions, and significant lab and diagnostic studies are as noted.

## 2025-03-25 NOTE — ASSESSMENT
[FreeTextEntry1] : The plan of care was discussed with the patient in full detail. She verbalized understanding and in agreement with the plan of care.  RTC in 4 weeks or early if needed will consider adding another antihypertensive agent or up titrating HCTZ if needed.

## 2025-03-25 NOTE — PHYSICAL EXAM
[Normal] : no acute distress, well nourished, well developed and well-appearing [No Respiratory Distress] : no respiratory distress  [No Accessory Muscle Use] : no accessory muscle use [Clear to Auscultation] : lungs were clear to auscultation bilaterally [Normal Rate] : normal rate  [Regular Rhythm] : with a regular rhythm [Normal S1, S2] : normal S1 and S2 [Normal Affect] : the affect was normal [Alert and Oriented x3] : oriented to person, place, and time [Normal Mood] : the mood was normal

## 2025-04-01 NOTE — PHYSICAL EXAM
[Well Developed] : well developed [Well Nourished] : well nourished [No Acute Distress] : no acute distress [Normal Conjunctiva] : normal conjunctiva [Normal Venous Pressure] : normal venous pressure [No Carotid Bruit] : no carotid bruit [Normal S1, S2] : normal S1, S2 [No Rub] : no rub [Clear Lung Fields] : clear lung fields

## 2025-04-02 NOTE — CARDIOLOGY SUMMARY
[de-identified] : 4.1.2025.  Sinus Rhythm  [de-identified] : 9.19.2024 CONCLUSIONS:   1. Left ventricular wall thickness is mildly increased. Left ventricular systolic function is normal with an ejection fraction visually estimated at 60 to 65%. There are no regional wall motion abnormalities seen. Mild left ventricular hypertrophy. There is increased LV mass and concentric hypertrophy. There is mild (grade 1) left ventricular diastolic dysfunction.  2. Normal right ventricular cavity size and normal right ventricular systolic function.  3. Structurally normal mitral valve with normal leaflet excursion. There is calcification of the mitral valve annulus. There is mild mitral regurgitation.  4. The aortic valve appears trileaflet with reduced systolic excursion. There is calcification of the aortic valve leaflets. There is severe aortic stenosis. The peak transaortic velocity is 3.96 m/s, peak transaortic gradient is 62.7 mmHg and mean transaortic gradient is 37.0 mmHg with an LVOT/aortic valve VTI ratio of 0.27. The aortic valve area is estimated at 0.69 cm by the continuity equation. There is mild aortic regurgitation.  5. Compared to the transthoracic echocardiogram performed on 2/11/2023, there have been no significant interval changes.

## 2025-04-02 NOTE — ADDENDUM
[FreeTextEntry1] : Documented by Mallorie Akins acting as a scribe for Dr. Adams 04/01/2025  All medical record entries made by the scribe were at my, Dr. Adams, direction and personally dictated by me on 04/01/2025. I have reviewed the chart and agree that the record accurately reflects my personal performance of the history, physical exam, assessment and plan. I have also personally directed, reviewed, and agreed with the chart.

## 2025-04-02 NOTE — HISTORY OF PRESENT ILLNESS
[FreeTextEntry1] : Ms. MICK NEWTON is a 73 year old female with Hx of Aortic stenosis, Dyspnea on exertion, and hyperlipidemia [facility]   Pt states she is feeling well but sometimes she feels short of breath.  Denies any, chest pain, abdominal pain, N/V/D, headache, dizziness, or leg swelling, palpitations, syncope or presyncope.  Reports compliance with taking her meds daily.

## 2025-04-02 NOTE — REVIEW OF SYSTEMS
[SOB] : shortness of breath [Dyspnea on exertion] : dyspnea during exertion [Negative] : Integumentary [Fever] : no fever [Headache] : no headache [Chills] : no chills [Feeling Fatigued] : not feeling fatigued [Blurry Vision] : no blurred vision [Seeing Double (Diplopia)] : no diplopia [Earache] : no earache [Sinus Pressure] : no sinus pressure [Chest Discomfort] : no chest discomfort [Palpitations] : no palpitations [Syncope] : no syncope [Cough] : no cough [Abdominal Pain] : no abdominal pain [Nausea] : no nausea

## 2025-04-02 NOTE — ASSESSMENT
[FreeTextEntry1] : - Aortic Stenosis- Echo showed severe aortic stenosis, I discussed with patient the benefits and risks of undergoing a TAVR procedure. Pt states that she understands and has agreed to proceed. Detailed discussion with the patient and family about Aortic stenosis , including the clinic presentation, symptoms and natural History. Also explained the interventional indications and options for SAVR vs TAVR. TAVR procedure was explained to them, including the potential risk not just limited to vascular injury, needs for vascular intervention, transfusion, MI, CVA, need for PPM, death.  Patient verbalized the understanding of above information and would like to pursue further w.u with CT, Cath. Cardiac Cath ordered today   Hypertension blood pressure is elevated but she is in significant pain because of her knees I recommend continue with hydrochlorothiazide and other medications and will reassess her blood pressure on the following visit.

## 2025-04-02 NOTE — HISTORY OF PRESENT ILLNESS
[FreeTextEntry1] : Ms. MICK NEWTON is a 73 year old female with Hx of Aortic stenosis, Dyspnea on exertion, and hyperlipidemia [facility]   Pt states she is feeling well but sometimes she feels short of breath.  Denies any, chest pain, abdominal pain, N/V/D, headache, dizziness, or leg swelling, palpitations, syncope or presyncope.  Reports compliance with taking her meds daily.

## 2025-04-02 NOTE — CARDIOLOGY SUMMARY
[de-identified] : 4.1.2025.  Sinus Rhythm  [de-identified] : 9.19.2024 CONCLUSIONS:   1. Left ventricular wall thickness is mildly increased. Left ventricular systolic function is normal with an ejection fraction visually estimated at 60 to 65%. There are no regional wall motion abnormalities seen. Mild left ventricular hypertrophy. There is increased LV mass and concentric hypertrophy. There is mild (grade 1) left ventricular diastolic dysfunction.  2. Normal right ventricular cavity size and normal right ventricular systolic function.  3. Structurally normal mitral valve with normal leaflet excursion. There is calcification of the mitral valve annulus. There is mild mitral regurgitation.  4. The aortic valve appears trileaflet with reduced systolic excursion. There is calcification of the aortic valve leaflets. There is severe aortic stenosis. The peak transaortic velocity is 3.96 m/s, peak transaortic gradient is 62.7 mmHg and mean transaortic gradient is 37.0 mmHg with an LVOT/aortic valve VTI ratio of 0.27. The aortic valve area is estimated at 0.69 cm by the continuity equation. There is mild aortic regurgitation.  5. Compared to the transthoracic echocardiogram performed on 2/11/2023, there have been no significant interval changes.

## 2025-04-03 NOTE — HISTORY OF PRESENT ILLNESS
[5] : 5 [N/A] : N/A [Denies] : Denies [Yes] : Yes [Informed consent documented in EHR.] : Informed consent documented in EHR. [24g] : 24g [Start Time: ___] : Medication Start Time: [unfilled] [End Time: ___] : Medication End Time: [unfilled] [Medication Name: ___] : Medication Name: [unfilled] [Total Amount Administered: ___] : Total Amount Administered: [unfilled] [IV discontinued. Intact. No signs or symptoms of IV complications noted. Time: ___] : IV discontinued. Intact. No signs or symptoms of IV complications noted. Time: [unfilled] [Patient  instructed to seek medical attention with signs and symptoms of adverse effects] : Patient  instructed to seek medical attention with signs and symptoms of adverse effects [Patient left unit in no acute distress] : Patient left unit in no acute distress [Medications administered as ordered and tolerated well.] : Medications administered as ordered and tolerated well. [Blood drawn at time of visit] : Blood drawn at time of visit [de-identified] : knees [de-identified] : ambulates with walker [de-identified] : right hand  [de-identified] :  Labs drawn as prescribed. [de-identified] : Patient presents for scheduled Orencia and Zoledronic Acid infusion, ambulating using rolling walker in NAD. Patient reports having joints pain as rated above. Patient reports having stiffness and swelling to knees. Patient also reports daily fatigue. Denies recent falls. No other symptoms or concerns verbalized at this time. Patient tolerated infusions well.

## 2025-04-07 NOTE — HISTORY OF PRESENT ILLNESS
[___ Month(s) Ago] : [unfilled] month(s) ago [RF] : rheumatoid factor [CCP] : Cyclic citrullinated peptide (CCP) antibody [Erosions] : erosions [FreeTextEntry1] : april 2025: Patient c/o bilateral kne join pain since 5 years, walking with walker. Difficulty in ADL , but is able to perform her basic ADL.  Recent admission in march 2025 for severe anemia, requiring transfusion of one unit of blood, S/P IV iron infusion *2, improvement in HB. Denies melena, hematemesis, vaginal bleeding.  However has h/o hematemesis in 20223, 2/2 esophagitis. 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 She is currently being evaluated for TAVR by cardiologist.  Denies morning stiffness, or hand joint swelling. Her knees are bothering her the most currently.  [Joint Swelling] : no joint swelling [Rash] : no rash [Shortness of Breath] : no shortness of breath [Joint Pain] : no joint pain [Ocular Symptoms] : no ocular symptoms [Dysphonia] : no dysphonia [Morning Stiffness] : no morning stiffness [Chest Pain] : no chest pain [Fatigue] : no fatigue [TextBox_2] : 2012 [TextBox_38] : MTX [TextBox_40] : Leflunomide, Plaquenil

## 2025-04-07 NOTE — PHYSICAL EXAM
[General Appearance - Alert] : alert [Sclera] : the sclera and conjunctiva were normal [Neck Appearance] : the appearance of the neck was normal [Respiration, Rhythm And Depth] : normal respiratory rhythm and effort [Auscultation Breath Sounds / Voice Sounds] : lungs were clear to auscultation bilaterally [Apical Impulse] : the apical impulse was normal [Heart Sounds] : normal S1 and S2 [Abdomen Tenderness] : non-tender [] : no rash [Oriented To Time, Place, And Person] : oriented to person, place, and time [FreeTextEntry1] : No tender joints , no swollen joints . Decreased ROM in bilateral wrists , Swollen and tender bilateral knees  Decreased and restricted ROM extension in bilateral knees wity bilateral knee crepitus

## 2025-04-07 NOTE — ASSESSMENT
[FreeTextEntry1] : 74 yo F with PMH of RA, iron deficiency anemia, aortic stenosis, who presents for follow up.  #AS  -Planning TAVR , following with cardiologist  -Perioperative DMARD planning. Can be taken for procedure 5 weeks s/p Orencia infusion, can be restarted after complete wound healing, if no infection after her first rheum visit post op -Okay to c/w Arava and HCQ with procedure    # Seropositive Erosive RA  - previously with poorly controlled disease. pt had been off treatment for several years (lost to follow up) and started Orencia SQ in August 2023. Symptoms overall much improved with Orencia, but recurred when she ran out of the medication. Orencia changed to IV from SC 6/2024 due to non adherence, with significant resolution in her joint pains after one IV infusion , However discontinued as she felt she had some darkening of her facial skin that was self limiting. Now started again 10/2024 -CDAI 2/2025: 3, low activity -  / positive  - Hand X-ray (May 2023) consistent with erosive disease - Quantiferon and viral hepatitis negative 4/2024, will obtain repeat at next infusion labs -Pt with mild thrombocytopenia, will decrease arava to 10mg from 20mg qd. Pt is pending TAVR eval prior to GI eval for anemia -Unclear if pt is taking HCQ, will check levels with next infusion labs - arava 10mg qd and HCQ 200mg qd, Monthly orencia, last dose april 2nd 2025 -Pt is now off prednisone  #Anemia  - Iron deficiency , s/p transfusion and IV iron in march 2025 -? 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 Normal C3 and C4 - No other stigmata of SLE - Saw Ophtho 12/2024, told to have cataracts on L eye - C/w  mg daily   # Osteoporosis  - Dexa 12/2023 with hip -2.5 - C/w vitamin D and Ca  - S/p zoledronic acid 3/20/2024, 4/2/2025  # HTN  - Elevated  - C/w losartan 100 and HCTZ 25 mg daily  - C/w Cardiology f/u  - Discussed BP management with PCP   # MGUS - Noted to have IgG kappa band - Established care with heme/onc, saw 4/2024  # Iron deficiency anemia - Iron studies consistent with MELVA - Colonoscopy showed diverticulosis in 2023 - EGD revealed esophagitis in 2023. 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 Chatman   RTO in 3 months  Lydia Zabala PGY4-Rheumatology Fellow

## 2025-04-14 NOTE — HISTORY OF PRESENT ILLNESS
[Dyslipidemia] : Dyslipidemia [Hypertension] : Hypertension [FreeTextEntry1] : Ms. Brush returns to our clinic today for a re-evaluation of her Aortic Stenosis. She was last seen in our office last October, however at that time she did not wish to proceed with TAVR. She has completed her TAVR screening. Recent catheterization demonstrates Mild CAD with no intervention warranted.  Today she presents with her . Thet speak English but also used Gema  to assist.  She remains in a wheelchair.  Reports she can not walk to chair given BL knee pain.  Walks with walker at home but leans on walker to ambulate room to room.  She reports she wishes to undergo TAVR now. She is sp Cath without CAD and TAVR CTA that showed moderate hiatal hernia containing a majority of the stomach.  Wishes to undergo TAVR to address her BL knee issues with ortho she was told can not happen until she addresses her valve.   She had a Transthoracic Echocardiogram which was evaluated with Dr. Valerie Bray, and demonstrates Likely trileaflet aortic valve with reduced opening.  Severe AS.  KATHE= 0.71cm2, DVI= 0.28.  Peak/mean gradients= 70/38mmHg, peak velocity= 4.1m/s.  This is at a heart rate of 98bpm.  Mild AI. LVOT calcification. MAC with a mean gradient of 5mmHg (HR 98bpm) and mild MR (no significant MS),  Mild TR  Normal biventricular function. Please note this TTE is from 9/2024   NYHA Classification: STS 30 day Mortality Risk Score:3.73%

## 2025-04-14 NOTE — REASON FOR VISIT
[Follow-Up: _____] : a [unfilled] follow-up visit [Interpreters_IDNumber] : [id number] [Interpreters_FullName] : Crista [TWNoteComboBox1] : Jacqueline

## 2025-04-14 NOTE — HISTORY OF PRESENT ILLNESS
[Dyslipidemia] : Dyslipidemia [Hypertension] : Hypertension [FreeTextEntry1] : Ms. Brush returns to our clinic today for a re-evaluation of her Aortic Stenosis. She was last seen in our office last October, however at that time she did not wish to proceed with TAVR. She has completed her TAVR screening. Recent catheterization demonstrates Mild CAD with no intervention warranted.  Today she presents with her . Thet speak English but also used [employer]  to assist.  She remains in a wheelchair.  Reports she can not walk to chair given BL knee pain.  Walks with walker at home but leans on walker to ambulate room to room.  She reports she wishes to undergo TAVR now. She is sp Cath without CAD and TAVR CTA that showed moderate hiatal hernia containing a majority of the stomach.  Wishes to undergo TAVR to address her BL knee issues with ortho she was told can not happen until she addresses her valve.   She had a Transthoracic Echocardiogram which was evaluated with Dr. Valerie Bray, and demonstrates Likely trileaflet aortic valve with reduced opening.  Severe AS.  KATHE= 0.71cm2, DVI= 0.28.  Peak/mean gradients= 70/38mmHg, peak velocity= 4.1m/s.  This is at a heart rate of 98bpm.  Mild AI. LVOT calcification. MAC with a mean gradient of 5mmHg (HR 98bpm) and mild MR (no significant MS),  Mild TR  Normal biventricular function. Please note this TTE is from 9/2024   NYHA Classification: STS 30 day Mortality Risk Score:3.73%

## 2025-04-14 NOTE — REASON FOR VISIT
[Follow-Up: _____] : a [unfilled] follow-up visit [Interpreters_IDNumber] : [id number] [Interpreters_FullName] : Crista [TWNoteComboBox1] : Jacqueline

## 2025-04-14 NOTE — REVIEW OF SYSTEMS
[Shortness Of Breath] : shortness of breath [SOB on Exertion] : shortness of breath during exertion [As Noted in HPI] : as noted in HPI [Negative] : Heme/Lymph

## 2025-04-14 NOTE — ASSESSMENT
[FreeTextEntry1] : I reviewed the cardiac imaging, medical records and reports with patient and discussed the case.  I discussed the risks, benefits and alternatives to both surgical aortic valve replacement (SAVR) and Transcatheter Aortic Valve Replacement (TAVR). Risks included but not limited to bleeding, stroke, Myocardial Infarction, kidney problems, blood transfusion, permanent  pacemaker implantation, infections and death. I also discussed the various approaches in detail.  I feel that the patient will benefit and is a candidate for TAVR. All questions and concerns were addressed and patient agrees to proceed with TAVR.  Plan:  - Proceed with TAVR, will get date today from structural team

## 2025-04-14 NOTE — PHYSICAL EXAM
[Sclera] : the sclera and conjunctiva were normal [Neck Appearance] : the appearance of the neck was normal [Jugular Venous Distention Increased] : there was no jugular-venous distention [] : no respiratory distress [Respiration, Rhythm And Depth] : normal respiratory rhythm and effort [Exaggerated Use Of Accessory Muscles For Inspiration] : no accessory muscle use [Auscultation Breath Sounds / Voice Sounds] : lungs were clear to auscultation bilaterally [Apical Impulse] : the apical impulse was normal [Heart Rate And Rhythm] : heart rate was normal and rhythm regular [Heart Sounds] : normal S1 and S2 [Abdomen Soft] : soft [Abdomen Tenderness] : non-tender [Involuntary Movements] : no involuntary movements were seen [No Focal Deficits] : no focal deficits [Oriented To Time, Place, And Person] : oriented to person, place, and time [Impaired Insight] : insight and judgment were intact [Affect] : the affect was normal [Mood] : the mood was normal [FreeTextEntry1] : 3/6 LUSB systolic murmur

## 2025-04-22 NOTE — HISTORY OF PRESENT ILLNESS
[FreeTextEntry1] : Follow up visit for chronic medical conditions.   [de-identified] : The patient is a 73-year-old F with PMHx of MELVA, anemia of chronic disease, RA, HTN, HLD, aortic stenosis, esophagitis, OA b/l knees who presents today for a follow up visit for chronic medical conditions.   #MELVA/ACD- Following hematology. getting Feraheme infusions. recent H/H stable.   # OA b/l knees- Following Ortho. She had multiple cortisone injections with minimal relief of pain. Has Had PT in the past with no symptomatic control. Currently taking Tylenol 2-3x/week for pain control. Mobility limited. using walker for assistance.

## 2025-04-22 NOTE — HEALTH RISK ASSESSMENT
[Some assistance needed] : managing finances [Full assistance needed] : using transportation [No] : No

## 2025-04-22 NOTE — PHYSICAL EXAM
[Normal] : no acute distress, well nourished, well developed and well-appearing [No Respiratory Distress] : no respiratory distress  [No Accessory Muscle Use] : no accessory muscle use [Clear to Auscultation] : lungs were clear to auscultation bilaterally [Normal Rate] : normal rate  [Regular Rhythm] : with a regular rhythm [Normal S1, S2] : normal S1 and S2 [No Edema] : there was no peripheral edema [No Joint Swelling] : no joint swelling [de-identified] : V/VI systolic murmur [de-identified] : limited ROM  B/L knees

## 2025-04-22 NOTE — ASSESSMENT
[FreeTextEntry1] : The plan of care was discussed with the patient in full detail. She verbalized understanding and in agreement with the plan of care.  RTC in 3 months or early if needed.

## 2025-05-12 NOTE — ASSESSMENT
[FreeTextEntry1] : Pt recovering well at home s/p TAVR. Reviewed all medications and dosages with pt understanding. Pt has all medications in home and is taking as prescribed. Pain controlled with current medication regimen. No new symptoms, issues or concerns to report at this time. [facility] SOC 5/11

## 2025-05-12 NOTE — HISTORY OF PRESENT ILLNESS
[FreeTextEntry1] :  73 year old female with anemia of chronic disease, RA, RA (on monthly infusions - last infusion 3/25) HTN, HLD, aortic stenosis who was sent by her hematologist to the ED for a low Hgb in the context of fatigue, lightheadedness, and shortness of breath for 2 months. These symptoms are worse with exertion and improve with rest. In the past 2-3 weeks, severe aortic stenosis with most recent [id number] revealing EF 60-65, severe AS with KATHE 0.69cm.Pt was evaluated by cardiology and referred to Ct surgery for TAVR work up s/p cardiac cath. Presents to PST for scheduled TAVR (femoral approach) on 5/8/25.  5/8 s/p TAVR, Valve: 23mm Evolut Fx, Anesthesia: MAC Access: transfemoral, Right groin 18Fr arterial sheath - closed with Perclose x 2, Left groin 6Fr arterial sheath removed with Perclose x 1, Left groin 8Fr venous sheath - TVP removed in the lab, manual pressure. Post op Course: Transfer to 2 [facility] Telemetry floor. New LBBB. 5/9 VSS; EKG reviewed by Dr. Gonzalez, with resolution of LBBB. -160's restarted Losartan 100 mg PO daily and HCTZ 25 mg PO daily. Post op TTE completed: < from: TTE W or WO Ultrasound Enhancing Agent (05.09.25 @ 07:25) Left ventricular cavity is normal in size. Left ventricular wall thickness is normal. Left ventricular systolic function is normal with an ejection fraction of 63 % by Alvarenga's method of disks. There are no regional wall motion abnormalities seen. A Evolut FX+ (TAVR) valve replacement is present in the aortic position The prosthetic valve is well seated. No intravalvular regurgitation No paravalvular regurgitation. No pericardial effusion seen. Patient medically cleared for discharge home with [facility]. 5/12 Initial visit completed at home  CC " I am doing ok"

## 2025-05-12 NOTE — PHYSICAL EXAM
[] : no respiratory distress [Exaggerated Use Of Accessory Muscles For Inspiration] : no accessory muscle use [Auscultation Breath Sounds / Voice Sounds] : lungs were clear to auscultation bilaterally [Heart Sounds] : normal S1 and S2 [Bowel Sounds] : normal bowel sounds [Oriented To Time, Place, And Person] : oriented to person, place, and time [FreeTextEntry2] : trace B/L LE edema  [FreeTextEntry1] : B/l groin JUDY, clean, dry and intact. No erythema, hematoma or drainage noted.

## 2025-05-12 NOTE — ASSESSMENT
[FreeTextEntry1] : 73 year old female with anemia of chronic disease, RA, RA (on monthly infusions last  infusion 3/25) HTN, HLD, aortic stenosis who was sent by her hematologist to the ED for a low Hgb in the context of fatigue, lightheadedness, and shortness of breath for 2 months. These symptoms are worse with exertion and improve with rest. In the past 2-3 weeks, severe aortic stenosis with most recent [id number] revealing EF 60-65, severe AS with KATHE 0.69cm.Pt was evaluated by cardiology and referred to Ct surgery for TAVR workup s/p cardiac cath.   5/8 s/p TAVR, Valve: 23mm Evolut Fx, Anesthesia: MAC Access: transfemoral, Right groin 18Fr arterial sheath - closed with Perclose x 2, Left groin 6Fr, arterial sheath removed with Perclose x 1, Left groin 8Fr venous sheath - TVP removed in the lab, manual pressure. Post op Course: Transfer to 2 [facility] Telemetry floor.  New LBBB. 5/9 VSS; EKG reviewed by Dr. Gonzalez, with resolution of LBBB.  SBP  150-160's restarted Losartan 100 mg PO daily and HCTZ 25 mg PO daily. Post op TTE completed:  A Evolut FX+ (TAVR) valve replacement is present in the aortic position The prosthetic valve is well seated. No intravalvular regurgitation No paravalvular regurgitation. No pericardial effusion seen. Patient medically cleared for discharge home with [facility].

## 2025-05-13 NOTE — ASSESSMENT
[FreeTextEntry1] : 73 year old female with anemia of chronic disease, RA, RA (on monthly infusions last  infusion 3/25) HTN, HLD, aortic stenosis who was sent by her hematologist to the ED for a low Hgb in the context of fatigue, lightheadedness, and shortness of breath for 2 months. These symptoms are worse with exertion and improve with rest. In the past 2-3 weeks, severe aortic stenosis with most recent [id number] revealing EF 60-65, severe AS with KATHE 0.69cm.Pt was evaluated by cardiology and referred to Ct surgery for TAVR workup s/p cardiac cath.   5/8 s/p TAVR, Valve: 23mm Evolut Fx, Anesthesia: MAC Access: transfemoral, Right groin 18Fr arterial sheath - closed with Perclose x 2, Left groin 6Fr, arterial sheath removed with Perclose x 1, Left groin 8Fr venous sheath - TVP removed in the lab, manual pressure. Post op Course: Transfer to 2 [facility] Telemetry floor.  New LBBB. 5/9 VSS; EKG reviewed by Dr. Gonzalez, with resolution of LBBB.  SBP  150-160's restarted Losartan 100 mg PO daily and HCTZ 25 mg PO daily. Post op TTE completed:  A Evolut FX+ (TAVR) valve replacement is present in the aortic position The prosthetic valve is well seated. No intravalvular regurgitation No paravalvular regurgitation. No pericardial effusion seen. Patient medically cleared for discharge home with OT.   Today she presents and reports that she is doing well . Denies any chest pain, shortness of breath, palpitations, dizziness or pedal edema.     Today on exam, patient's lungs are clear bilaterally, normal sinus rhythm and bilateral groins are clean, dry and intact. There is no hematoma. No peripheral edema noted on exam. EKG performed and reviewed. SBE antibiotic prophylaxis discussed at length.  Patient instructed to continue current medication regimen and follow up with cardiology.   Plan: 1. Continue current medication regimen 2. TTE in 1 month with cardiologist ( Dr. Rhonda Adams)  3. Follow up with Cardiologist and PCP 4. SBE Prophylaxis discuss in length 5. May return to clinic on PRN basis

## 2025-06-02 NOTE — ASSESSMENT
[FreeTextEntry1] : 74 yo F with PMH of RA, iron deficiency anemia, aortic stenosis, who presents for follow up.  # Seropositive Erosive RA  - previously with poorly controlled disease. pt had been off treatment for several years (lost to follow up) and started Orencia SQ in August 2023. Symptoms overall much improved with Orencia, but recurred when she ran out of the medication. Orencia changed to IV from SC 6/2024 due to non adherence, with significant resolution in her joint pains after one IV infusion , However discontinued as she felt she had some darkening of her facial skin that was self limiting. Now started again 10/2024 -CDAI 6/2025: 11, moderate, off orencia since april 2025 for TAVR -  / positive  - Hand X-ray (May 2023) consistent with erosive disease - Quantiferon and viral hepatitis negative 4/2024, will obtain repeat at next infusion labs -Pt with mild thrombocytopenia, will decrease arava to 10mg from 20mg qd. Pt is pending TAVR eval prior to GI eval for anemia -Current regimen: Arava 10mg daily, HCQ 200mg daily, Orencia to restart in june 2025  #AS  -S/pTAVR on may 8th , held orencia in april. -Perioperative DMARD planning. Can be taken for procedure 5 weeks s/p Orencia infusion(Last dose april 2nd), can be restarted after complete wound healing, if no infection after her first rheum visit post op -Okay to c/w Arava and HCQ with procedure  -C spine x-ray flexion and Extension : No AA stability    #Anemia  - Iron deficiency , s/p transfusion and IV iron in march 2025 -? 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 Normal C3 and C4 - No other stigmata of SLE - Saw Ophtho 12/2024, told to have cataracts on L eye - C/w  mg daily   # Osteoporosis  - Dexa 12/2023 with hip -2.5 - C/w vitamin D and Ca  - S/p zoledronic acid 3/20/2024, 4/2/2025  # HTN  - Elevated  - C/w losartan 100 and HCTZ 25 mg daily  - C/w Cardiology f/u  - Discussed BP management with PCP   # MGUS - Noted to have IgG kappa band - Established care with heme/onc, saw 4/2024  # Iron deficiency anemia - Iron studies consistent with MELVA - Colonoscopy showed diverticulosis in 2023 - EGD revealed esophagitis in 2023. 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 Levin  RTO in 3 months  Lydia Zabala PGY4-Rheumatology Fellow

## 2025-06-02 NOTE — PHYSICAL EXAM
[General Appearance - Alert] : alert [Sclera] : the sclera and conjunctiva were normal [Neck Appearance] : the appearance of the neck was normal [Respiration, Rhythm And Depth] : normal respiratory rhythm and effort [Auscultation Breath Sounds / Voice Sounds] : lungs were clear to auscultation bilaterally [Apical Impulse] : the apical impulse was normal [Heart Sounds] : normal S1 and S2 [Abdomen Tenderness] : non-tender [] : no rash [Oriented To Time, Place, And Person] : oriented to person, place, and time [FreeTextEntry1] : 2 MCP swollen and tender . Decreased ROM in bilateral wrists , Swollen and tender bilateral knees  Decreased and restricted ROM extension in bilateral knees wity bilateral knee crepitus

## 2025-06-02 NOTE — HISTORY OF PRESENT ILLNESS
[___ Month(s) Ago] : [unfilled] month(s) ago [RF] : rheumatoid factor [CCP] : Cyclic citrullinated peptide (CCP) antibody [Erosions] : erosions [FreeTextEntry1] : june 2025: S/p TAVR on May 8th , held orencia for procedure Will restart all medications Feels well, Main issue is her right >left knee    [Joint Swelling] : no joint swelling [Rash] : no rash [Shortness of Breath] : no shortness of breath [Joint Pain] : no joint pain [Ocular Symptoms] : no ocular symptoms [Dysphonia] : no dysphonia [Morning Stiffness] : no morning stiffness [Chest Pain] : no chest pain [Fatigue] : no fatigue [TextBox_2] : 2012 [TextBox_38] : MTX [TextBox_40] : Leflunomide, Plaquenil

## 2025-06-21 NOTE — HISTORY OF PRESENT ILLNESS
[5] : 5 [N/A] : N/A [Denies] : Denies [Yes] : Yes [Declined] : Declined [Informed consent documented in EHR.] : Informed consent documented in EHR. [de-identified] : generalized joint pain  [de-identified] : ambulates with walker [24g] : 24g [Start Time: ___] : Medication Start Time: [unfilled] [End Time: ___] : Medication End Time: [unfilled] [Medication Name: ___] : Medication Name: [unfilled] [Total Amount Administered: ___] : Total Amount Administered: [unfilled] [IV discontinued. Intact. No signs or symptoms of IV complications noted. Time: ___] : IV discontinued. Intact. No signs or symptoms of IV complications noted. Time: [unfilled] [Patient  instructed to seek medical attention with signs and symptoms of adverse effects] : Patient  instructed to seek medical attention with signs and symptoms of adverse effects [Patient left unit in no acute distress] : Patient left unit in no acute distress [Medications administered as ordered and tolerated well.] : Medications administered as ordered and tolerated well. [Blood drawn at time of visit] : Blood drawn at time of visit [de-identified] : left arm cephalic vein  [de-identified] :  Labs drawn as prescribed. [de-identified] : Patient presents for scheduled Orencia infusion, ambulating using rolling walker in NAD. Patient reports having joints pain as rated above. Patient reports having heart valve surgery on 05/08 without complications. MD aware and okay to resume infusions. Patient reports having stiffness and swelling to feet and hands. Denies recent falls.  Patient's BP was elevated, patient denies SOB, CP or other accompanying symptoms. No other symptoms or concerns verbalized at this time. Denies recent infections or abx use. Patient tolerated infusions well.

## 2025-06-25 NOTE — ASSESSMENT
[FreeTextEntry1] : Severe aortic stenosis status post TAVR.  She is clinically doing well she does have lower extremity edema review of her echocardiogram from the hospital shows LV was hyperdynamic currently she is tachycardic I think she will benefit from rate control starting on beta-blockers metoprolol 50 mg daily.  Will follow-up in 1 month with repeat echocardiogram she will need lifelong antibiotic prophylaxis against endocarditis as per guidelines.  HTN: blood pressure is elevated 154/77, redone 148/74 but she is in significant pain because of her knees I recommend adding metoprolol reevaluate in 1 month  I will be starting her on metoprolol 50 mg  daily, as well as ordering an echocardiogram. I will follow up with her in 1 month   From cardiac standpoint patient is optimized to proceed with cataract and eye surgery after we get a follow-up echo on the next visit next month

## 2025-06-25 NOTE — CARDIOLOGY SUMMARY
[de-identified] : 06/25/2025: sinus tachycardia  voltage criteria for LVH (R(I)+S(III) exceeds 2.50 mV) voltage criteria w/o ST/T abnormality may be normal

## 2025-06-25 NOTE — HISTORY OF PRESENT ILLNESS
[FreeTextEntry1] : Ms. MICK NEWTON is a 73 year old female with Hx of Aortic stenosis, patient is status post TAVR 23 mm CHIQUI valve doing well currently no dyspnea chest pain syncope presyncope her main issue is related to arthritis and pain in both the knees.  Patient also reports that she has been having a little more edema in the legs.

## 2025-06-25 NOTE — ADDENDUM
[FreeTextEntry1] :  Documented by Yeimy Mcclain acting as scribe for Dr. Adams 06/25/2025  All medical record entries made by the scribe were at my, Dr. Adams, direction and personally dictated by me on 06/25/2025. I have reviewed the chart and agree that the record accurately reflects my personal performance of the history, physical exam, assessment and plan. I have also personally directed, reviewed, and agreed with the chart.

## 2025-07-03 NOTE — PHYSICAL EXAM
[de-identified] : Constitutional:  73 year old female, alert and oriented, cooperative, in no acute distress.  HEENT  NC/AT.  Appearance: symmetric  Chest/Respiratory  Respiratory effort: no intercostal retractions or use of accessory muscles. Nonlabored Breathing  Mental Status:  Judgment, insight: intact Orientation: oriented to time, place, and person  Left Knee  Inspection:     Skin intact, no rashes or lesions     No Effusion     Non-tender to palpation over tibial tubercle, patella, lateral joint line, and pes insertion.     Tenderness over the medial joint line      Range of Motion: 	Extension - -20degrees 	Flexion - 60 degrees 	Extensor lag: None  Stability:      Demonstrates no Varus or Valgus instability      Negative Anterior or Posterior drawer.      Negative Lachman's  Patella: stable, tracks well.   Right Knee  Inspection:     Skin intact, no rashes or lesions     No Effusion     Non-tender to palpation over tibial tubercle, patella, lateral joint line, and pes insertion.     Tenderness over the medial joint line      Range of Motion: 	Extension - -20degrees 	Flexion - 60 degrees 	Extensor lag: None  Stability:      Demonstrates no Varus or Valgus instability      Negative Anterior or Posterior drawer.      Negative Lachman's  Patella: stable, tracks well.   Neurologic Exam     Motor intact including 5/5 Extensor Hallucis Longus, 5/5 Flexor Hallucis Longus, 5/5 Tibialis Anterior and 5/5 Gastrocnemius     Sensation Intact to Light Touch including Saphenous, Sural, Superficial Peroneal, Deep Peroneal, Tibial nerve distributions  Vascular Exam     Foot is warm and well perfused with 2+ Dorsalis Pedis Pulse   No pain with range of motion of the bilateral hips. No lumbar paraspinal muscle tenderness. [de-identified] : XRay:  XRays of the Pelvis (1 View) taken on 10/1/2024. XRays demonstrate no obvious fracture or dislocation. There is osteoarthritis in the right hip. 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 (my personal interpretation).  XRay: XRays of the Right Knee (4 Views) taken on 10/1/2024. XRays demonstrate tricompartmental joint space narrowing, with bone on bone articulations, with subchondral sclerosis, overlying osteophytes, all consistent with severe osteoarthritis, KL Grade: 4. (my personal interpretation)  XRay: XRays of the Left Knee (4 Views) taken on 10/1/2024. XRays demonstrate tricompartmental joint space narrowing, with bone on bone articulations, with subchondral sclerosis, overlying osteophytes, all consistent with severe osteoarthritis, KL Grade: 4. (my personal interpretation)

## 2025-07-03 NOTE — DISCUSSION/SUMMARY
[de-identified] : James Brush is a 73-year-old female presents to the office for evaluation of her bilateral knee pain.  Patient has been experiencing bilateral knee pain for a few years.  X-rays showed severe bilateral knee osteoarthritis.  Examination showed decreased bilateral knee range of motion. Discussed with the patient the examination and imaging findings. Discussed with the patient the operative and nonoperative management of knee osteoarthritis, including total knee arthroplasty.   Discussed that due to patient's cardiac issues that she would be indicated for nonoperative management at this time. Patient would like to continue with nonoperative management at this time. Discussed with the patient the nonoperative management of patient's knee osteoarthritis at this time, including physical therapy, anti-inflammatories, and injections. Patient will continue her home exercises.  Patient will take Tylenol as needed for pain control.  Patient will follow up with Cardiology regarding TAVR recovery and timing for TKA. Patient will follow-up in 6 weeks for reevaluation and management.  Patient understanding and in agreement the plan.  All questions answered   Plan: -Follow up with Cardiology -Home Exercises -Tylenol as needed for pain control -Follow up in 3 months for reevaluation and management

## 2025-07-03 NOTE — HISTORY OF PRESENT ILLNESS
[de-identified] : 7/3/2025  James Brush presents to the office for follow-up of her bilateral knee pain.  Patient continues to have significant bilateral knee pain.  She underwent TAVR on 5/8/2025.  She was told by her cardiologist that she could plan TKA about 3 months after TAVR.  Patient is planned to follow-up with cardiology in August.  11/14/2024  James Brush   Presents to the office for follow-up of her bilateral knee pain.  Patient continues to have bilateral knee pain and decreased range of motion.  Patient was seen by cardiology and is going to plan for TAVR in June 2025.  No falls.  No fevers or chills.  10/1/2024 James Brush is a 73-year-old female presents the office for evaluation of her bilateral knee pain.  Patient has been experiencing bilateral knee pain for a few years.  She has a history of rheumatoid arthritis and is on prednisone, Orencia, and leflunomide.  She has decreased knee range of motion.  She can have right hip pain.  Patient tried injections, without relief.  No falls.  No fevers or chills.  She has not tried physical therapy.  Of note, patient has follow-up with cardiology regarding possible TAVR.  History: RA, Anemia, Aortic Stenosis

## 2025-07-22 NOTE — PHYSICAL EXAM
[Normal] : no acute distress, well nourished, well developed and well-appearing [No Respiratory Distress] : no respiratory distress  [No Accessory Muscle Use] : no accessory muscle use [Clear to Auscultation] : lungs were clear to auscultation bilaterally [Normal Rate] : normal rate  [Regular Rhythm] : with a regular rhythm [Normal S1, S2] : normal S1 and S2 [No Edema] : there was no peripheral edema [No Joint Swelling] : no joint swelling [de-identified] : V/VI systolic murmur [de-identified] : limited ROM  B/L knees

## 2025-07-22 NOTE — HISTORY OF PRESENT ILLNESS
[FreeTextEntry1] : Follow up visit for chronic medical conditions.  [de-identified] : The patient is a 73-year-old F with PMHx of MELVA, anemia of chronic disease, RA, HTN, HLD, aortic stenosis (S/p TAVR (transcatheter aortic valve replacement, bioprosthetic), esophagitis, OA b/l knees who presents today for a follow up visit for chronic medical conditions.   #Aortic stenosis - S/p TAVR (transcatheter aortic valve replacement, bioprosthetic 05/08/2025. Following CTSx and cardiology.   #MELVA/ACD- Following hematology. getting Feraheme infusions. recent H/H stable.   # OA b/l knees- Following Ortho. She had multiple cortisone injections with minimal relief of pain. Has Had PT in the past with no symptomatic control. Currently taking Tylenol 2-3x/week for pain control. Mobility limited. using walker for assistance.